# Patient Record
Sex: MALE | Race: WHITE
[De-identification: names, ages, dates, MRNs, and addresses within clinical notes are randomized per-mention and may not be internally consistent; named-entity substitution may affect disease eponyms.]

---

## 2017-01-20 ENCOUNTER — HOSPITAL ENCOUNTER (OUTPATIENT)
Dept: HOSPITAL 53 - M LABDRAW1 | Age: 62
End: 2017-01-20
Attending: FAMILY MEDICINE
Payer: OTHER GOVERNMENT

## 2017-01-20 DIAGNOSIS — I10: Primary | ICD-10-CM

## 2017-01-20 DIAGNOSIS — E78.2: ICD-10-CM

## 2017-01-20 LAB
ALBUMIN SERPL BCG-MCNC: 4.1 GM/DL (ref 3.2–5.2)
ALBUMIN/GLOB SERPL: 1.58 {RATIO} (ref 1–1.93)
ALP SERPL-CCNC: 55 U/L (ref 45–117)
ALT SERPL W P-5'-P-CCNC: 28 U/L (ref 12–78)
ANION GAP SERPL CALC-SCNC: 9 MEQ/L (ref 8–16)
AST SERPL-CCNC: 22 U/L (ref 15–37)
BILIRUB SERPL-MCNC: 0.4 MG/DL (ref 0.2–1)
BUN SERPL-MCNC: 24 MG/DL (ref 7–18)
CALCIUM SERPL-MCNC: 8.8 MG/DL (ref 8.5–10.1)
CHLORIDE SERPL-SCNC: 107 MEQ/L (ref 98–107)
CHOLEST SERPL-MCNC: 135 MG/DL (ref ?–200)
CO2 SERPL-SCNC: 25 MEQ/L (ref 21–32)
CREAT SERPL-MCNC: 0.93 MG/DL (ref 0.7–1.3)
GFR SERPL CREATININE-BSD FRML MDRD: > 60 ML/MIN/{1.73_M2} (ref 56–?)
GLUCOSE SERPL-MCNC: 89 MG/DL (ref 70–105)
POTASSIUM SERPL-SCNC: 4.3 MEQ/L (ref 3.5–5.1)
PROT SERPL-MCNC: 6.7 GM/DL (ref 6.4–8.2)
SODIUM SERPL-SCNC: 141 MEQ/L (ref 136–145)
TRIGL SERPL-MCNC: 50 MG/DL (ref ?–150)

## 2017-11-10 ENCOUNTER — HOSPITAL ENCOUNTER (OUTPATIENT)
Dept: HOSPITAL 53 - M LABDRAW1 | Age: 62
End: 2017-11-10
Attending: FAMILY MEDICINE
Payer: OTHER GOVERNMENT

## 2017-11-10 DIAGNOSIS — I10: ICD-10-CM

## 2017-11-10 DIAGNOSIS — K43.9: ICD-10-CM

## 2017-11-10 DIAGNOSIS — E78.2: ICD-10-CM

## 2017-11-10 DIAGNOSIS — Z00.00: Primary | ICD-10-CM

## 2017-11-10 LAB
ALBUMIN SERPL BCG-MCNC: 3.8 GM/DL (ref 3.2–5.2)
ALBUMIN/GLOB SERPL: 1.41 {RATIO} (ref 1–1.93)
ALP SERPL-CCNC: 49 U/L (ref 45–117)
ALT SERPL W P-5'-P-CCNC: 25 U/L (ref 12–78)
ANION GAP SERPL CALC-SCNC: 8 MEQ/L (ref 8–16)
AST SERPL-CCNC: 25 U/L (ref 7–37)
BASOPHILS # BLD AUTO: 0.1 10^3/UL (ref 0–0.2)
BASOPHILS NFR BLD AUTO: 0.6 % (ref 0–1)
BILIRUB SERPL-MCNC: 0.7 MG/DL (ref 0.2–1)
BUN SERPL-MCNC: 13 MG/DL (ref 7–18)
CALCIUM SERPL-MCNC: 8.6 MG/DL (ref 8.5–10.1)
CHLORIDE SERPL-SCNC: 103 MEQ/L (ref 98–107)
CHOLEST SERPL-MCNC: 128 MG/DL (ref ?–200)
CO2 SERPL-SCNC: 27 MEQ/L (ref 21–32)
CREAT SERPL-MCNC: 0.85 MG/DL (ref 0.7–1.3)
EOSINOPHIL # BLD AUTO: 0.2 10^3/UL (ref 0–0.5)
EOSINOPHIL NFR BLD AUTO: 1.7 % (ref 0–3)
ERYTHROCYTE [DISTWIDTH] IN BLOOD BY AUTOMATED COUNT: 13.6 % (ref 11.5–14.5)
GFR SERPL CREATININE-BSD FRML MDRD: > 60 ML/MIN/{1.73_M2} (ref 56–?)
GLUCOSE SERPL-MCNC: 86 MG/DL (ref 70–105)
IMM GRANULOCYTES NFR BLD: 0.1 % (ref 0–0)
LYMPHOCYTES # BLD AUTO: 2.9 10^3/UL (ref 1.5–4.5)
LYMPHOCYTES NFR BLD AUTO: 33.9 % (ref 24–44)
MCH RBC QN AUTO: 32.8 PG (ref 27–33)
MCHC RBC AUTO-ENTMCNC: 33.6 G/DL (ref 32–36.5)
MCV RBC AUTO: 97.5 FL (ref 80–96)
MONOCYTES # BLD AUTO: 0.8 10^3/UL (ref 0–0.8)
MONOCYTES NFR BLD AUTO: 9.6 % (ref 0–5)
NEUTROPHILS # BLD AUTO: 4.7 10^3/UL (ref 1.8–7.7)
NEUTROPHILS NFR BLD AUTO: 54.1 % (ref 36–66)
NRBC BLD AUTO-RTO: 0 % (ref 0–0)
PLATELET # BLD AUTO: 206 10^3/UL (ref 150–450)
POTASSIUM SERPL-SCNC: 4.2 MEQ/L (ref 3.5–5.1)
PROT SERPL-MCNC: 6.5 GM/DL (ref 6.4–8.2)
SODIUM SERPL-SCNC: 138 MEQ/L (ref 136–145)
TRIGL SERPL-MCNC: 67 MG/DL (ref ?–150)
WBC # BLD AUTO: 8.7 10^3/UL (ref 4–10)

## 2017-11-20 ENCOUNTER — HOSPITAL ENCOUNTER (OUTPATIENT)
Dept: HOSPITAL 53 - M SDC | Age: 62
Discharge: HOME | End: 2017-11-20
Attending: SURGERY
Payer: OTHER GOVERNMENT

## 2017-11-20 VITALS — DIASTOLIC BLOOD PRESSURE: 72 MMHG | SYSTOLIC BLOOD PRESSURE: 128 MMHG

## 2017-11-20 VITALS — BODY MASS INDEX: 26.65 KG/M2 | WEIGHT: 190.4 LBS | HEIGHT: 71 IN

## 2017-11-20 DIAGNOSIS — Z72.0: ICD-10-CM

## 2017-11-20 DIAGNOSIS — E78.00: ICD-10-CM

## 2017-11-20 DIAGNOSIS — I10: ICD-10-CM

## 2017-11-20 DIAGNOSIS — Z79.899: ICD-10-CM

## 2017-11-20 DIAGNOSIS — K40.90: Primary | ICD-10-CM

## 2017-11-20 PROCEDURE — 88304 TISSUE EXAM BY PATHOLOGIST: CPT

## 2017-11-20 PROCEDURE — 49650 LAP ING HERNIA REPAIR INIT: CPT

## 2017-11-20 NOTE — ROOPDOC
[facility] Report Of Operation


Report of Operation


DATE OF PROCEDURE: 11/20/17





PREPROCEDURE DIAGNOSES: Recurrent left inguinal hernia





POSTPROCEDURE DIAGNOSES: Left Indirect Inguinal Hernia.





PROCEDURE: Robotic Assisted Laparoscopic Left Inguinal Hernia Repair (PARVIZ). 





SURGEON: ELIAN Cesar MD





ASSISTANT: ROBERT Maldonado NP





ANESTHESIA: General Anesthesia.





ESTIMATED BLOOD LOSS: Approximately 20 mL. 





COMPLICATIONS: none. 





REMARKS: 61 M with recurrent left inguinal hernia. Previously had an open 

inguinal hernia repair.





PROCEDURE NOTE: transient bradycardia on initial insufflation, procedure was 

done at 12 mm Hg. Large preperiotneal tissue (cord lipoma into the internal 

inguinal ring removed. Large 3D-Max light mesh placed at the preperitoneal 

space and secured to the pubic tubercle with 2-0 vicryl.. 





DESCRIPTION OF PROCEDURE: 





Patient received 2 g of Ancef IV preoperatively for wound prophylaxis. Patient 

was brought to the operating room, placed supine on the operating table. 

Compression boots placed in both lower extremities for DVT prophylaxis. After 

adequate general anesthesia started, he was placed on a lithotomy position. A 

son catheter placed without difficulty. His abdomen and groin/pelvic area 

then prepped and draped in the usual sterile fashion. After a surgical timeout 

we began our surgery.








Entry to the abdomen done through a small incision above the umbilical skin 

cleft. A Veress needle is inserted on a controlled fashion. CO2 insufflation 

started to pressure 15 mmHg. I was informed that time that patient is having 

bradycardia. He was treated for this. We released the pneumoperitoneum with 

increase of his heart rate. After his heart rate has to have lysed to be 

resumed pneumoperitoneum at 12 mmHg. Using the same incision a 5 mm Visiport 

was then placed under direct vision of laparoscope. The insertion site was 

inspected for injury and none was found. Patient was then positioned on a 

Trendelenburg position with the symptomatic (left) side tilted upwards for 

adequate view of the hernia defect. 2 working ports placed to the right and 

left of the umbilicus along the same line. The da Miranda robot to our was then 

positioned in between the patient's legs and the trochars doctor onto the 

robot. I then unscrubbed and to control of the camera and the laparoscopic 

instruments at the surgeon's console.





Patient only had a small opening through his peritoneum at the indirect space. 

Once this was opened up he had a large preperitoneal fat/cord lipoma.





Starting at the left side. The peritoneum was opened up about 3 cm above the 

superior edge of the fascial defect of the inguinal hernia starting at the 

medial umbilical ligament going in an arc-like fashion laterally towards the 

level of the anterior superior iliac spine. This was then dissected mostly 

bluntly away from the abdominal wall. On approaching the inguinal hernia defect 

the hernia sac was pulled back into the preperitoneal space and carefully 

dissected off the testicular vessels and vas deferens. He has a large and 

redundant inguinal hernia sac which was fully reduced back into the abdomen. A 

large preperitoneal cord lipoma was found and dissected free from the rest of 

the structures. Once this was fully reduced into the abdomen a had to divide 

this away from the rest of the preperitoneal fat tissue with the Bovie cautery 

while controlling for bleeding. After freeing up the hernia sac we continued 

dissecting it off inferiorly to  from the vas deferens and testicular 

vessels. The preperitoneal space was dissected medially to expose the pubic 

tubercle up towards the symphysis pubis. The remaining areolar  fibers were 

bluntly dissected away from the abdominal wall to create space for the mesh. 

After fully dissecting the preperitoneal space, we checked for adequate 

hemostasis.





A large sized 3-D Max light mesh was chosen. This was placed through the 

laparoscopic port into the abdomen. This was positioned in the preperitoneal 

space abutting the abdominal wall to adequately cover the indirect as well as 

direct hernia space. I made sure the mesh was laying flat on the abdominal 

wall. This was secured onto the pubic tubercle with a single stitch of 2-0 

Vicryl. Again after checking for hemostasis the preperitoneal space was then 

closed by suturing the peritoneal incision using a running stitch of 2-0V AUSTIN PETERSON MD Nov 20, 2017 13:46

## 2020-12-03 ENCOUNTER — HOSPITAL ENCOUNTER (OUTPATIENT)
Dept: HOSPITAL 53 - M PLALAB | Age: 65
End: 2020-12-03
Attending: NURSE PRACTITIONER
Payer: COMMERCIAL

## 2020-12-03 DIAGNOSIS — I10: Primary | ICD-10-CM

## 2020-12-03 LAB
ALBUMIN SERPL BCG-MCNC: 3.8 GM/DL (ref 3.2–5.2)
ALT SERPL W P-5'-P-CCNC: 28 U/L (ref 12–78)
BILIRUB SERPL-MCNC: 0.5 MG/DL (ref 0.2–1)
BUN SERPL-MCNC: 19 MG/DL (ref 7–18)
CALCIUM SERPL-MCNC: 8.9 MG/DL (ref 8.8–10.2)
CHLORIDE SERPL-SCNC: 103 MEQ/L (ref 98–107)
CHOLEST SERPL-MCNC: 155 MG/DL (ref ?–200)
CHOLEST/HDLC SERPL: 3.37 {RATIO} (ref ?–5)
CO2 SERPL-SCNC: 27 MEQ/L (ref 21–32)
CREAT SERPL-MCNC: 0.84 MG/DL (ref 0.7–1.3)
GFR SERPL CREATININE-BSD FRML MDRD: > 60 ML/MIN/{1.73_M2} (ref 49–?)
GLUCOSE SERPL-MCNC: 82 MG/DL (ref 70–100)
HDLC SERPL-MCNC: 46 MG/DL (ref 40–?)
LDLC SERPL CALC-MCNC: 91 MG/DL (ref ?–100)
NONHDLC SERPL-MCNC: 109 MG/DL
POTASSIUM SERPL-SCNC: 4.6 MEQ/L (ref 3.5–5.1)
PROT SERPL-MCNC: 7.2 GM/DL (ref 6.4–8.2)
SODIUM SERPL-SCNC: 137 MEQ/L (ref 136–145)
TRIGL SERPL-MCNC: 88 MG/DL (ref ?–150)

## 2022-01-13 ENCOUNTER — HOSPITAL ENCOUNTER (OUTPATIENT)
Dept: HOSPITAL 53 - M PLALAB | Age: 67
End: 2022-01-13
Attending: NURSE PRACTITIONER
Payer: COMMERCIAL

## 2022-01-13 DIAGNOSIS — I10: Primary | ICD-10-CM

## 2022-01-13 DIAGNOSIS — E78.2: ICD-10-CM

## 2022-01-13 LAB
ALBUMIN SERPL BCG-MCNC: 3.9 GM/DL (ref 3.2–5.2)
ALT SERPL W P-5'-P-CCNC: 28 U/L (ref 12–78)
BILIRUB SERPL-MCNC: 0.6 MG/DL (ref 0.2–1)
BUN SERPL-MCNC: 16 MG/DL (ref 7–18)
CALCIUM SERPL-MCNC: 9.1 MG/DL (ref 8.8–10.2)
CHLORIDE SERPL-SCNC: 108 MEQ/L (ref 98–107)
CHOLEST SERPL-MCNC: 142 MG/DL (ref ?–200)
CHOLEST/HDLC SERPL: 3.15 {RATIO} (ref ?–5)
CO2 SERPL-SCNC: 27 MEQ/L (ref 21–32)
CREAT SERPL-MCNC: 0.92 MG/DL (ref 0.7–1.3)
GFR SERPL CREATININE-BSD FRML MDRD: > 60 ML/MIN/{1.73_M2} (ref 49–?)
GLUCOSE SERPL-MCNC: 87 MG/DL (ref 70–100)
HDLC SERPL-MCNC: 45 MG/DL (ref 40–?)
LDLC SERPL CALC-MCNC: 74 MG/DL (ref ?–100)
NONHDLC SERPL-MCNC: 97 MG/DL
POTASSIUM SERPL-SCNC: 4.7 MEQ/L (ref 3.5–5.1)
PROT SERPL-MCNC: 7 GM/DL (ref 6.4–8.2)
SODIUM SERPL-SCNC: 142 MEQ/L (ref 136–145)
TRIGL SERPL-MCNC: 117 MG/DL (ref ?–150)

## 2023-10-04 ENCOUNTER — HOSPITAL ENCOUNTER (OUTPATIENT)
Dept: HOSPITAL 53 - M OPP | Age: 68
Discharge: HOME | End: 2023-10-04
Attending: SURGERY
Payer: COMMERCIAL

## 2023-10-04 VITALS — HEIGHT: 71 IN | BODY MASS INDEX: 27.58 KG/M2 | WEIGHT: 197 LBS

## 2023-10-04 VITALS — DIASTOLIC BLOOD PRESSURE: 77 MMHG | TEMPERATURE: 97.2 F | OXYGEN SATURATION: 96 % | SYSTOLIC BLOOD PRESSURE: 114 MMHG

## 2023-10-04 DIAGNOSIS — I10: ICD-10-CM

## 2023-10-04 DIAGNOSIS — Z87.891: ICD-10-CM

## 2023-10-04 DIAGNOSIS — Z12.11: Primary | ICD-10-CM

## 2023-10-04 DIAGNOSIS — Z79.899: ICD-10-CM

## 2023-10-04 DIAGNOSIS — K63.5: ICD-10-CM

## 2023-10-04 DIAGNOSIS — M16.11: ICD-10-CM

## 2023-10-04 DIAGNOSIS — E78.5: ICD-10-CM

## 2024-11-15 ENCOUNTER — HOSPITAL ENCOUNTER (OUTPATIENT)
Dept: HOSPITAL 53 - M LABDRAWC | Age: 69
End: 2024-11-15
Attending: NURSE PRACTITIONER
Payer: MEDICARE

## 2024-11-15 DIAGNOSIS — I10: Primary | ICD-10-CM

## 2024-11-15 DIAGNOSIS — E78.2: ICD-10-CM

## 2024-11-15 LAB
ALBUMIN SERPL BCG-MCNC: 3.5 G/DL (ref 3.2–5.2)
ALP SERPL-CCNC: 61 U/L (ref 40–129)
ALT SERPL W P-5'-P-CCNC: 22 U/L (ref 7–40)
AST SERPL-CCNC: 18 U/L (ref ?–34)
BILIRUB SERPL-MCNC: 0.4 MG/DL (ref 0.3–1.2)
BUN SERPL-MCNC: 16 MG/DL (ref 9–23)
CALCIUM SERPL-MCNC: 9.4 MG/DL (ref 8.3–10.6)
CHLORIDE SERPL-SCNC: 108 MMOL/L (ref 98–107)
CHOLEST SERPL-MCNC: 134 MG/DL (ref ?–200)
CHOLEST/HDLC SERPL: 3.51 {RATIO} (ref ?–5)
CO2 SERPL-SCNC: 27 MMOL/L (ref 20–31)
CREAT SERPL-MCNC: 0.75 MG/DL (ref 0.7–1.3)
GFR SERPL CREATININE-BSD FRML MDRD: > 60 ML/MIN/{1.73_M2} (ref 49–?)
GLUCOSE SERPL-MCNC: 88 MG/DL (ref 74–106)
HDLC SERPL-MCNC: 38.1 MG/DL (ref 40–?)
LDLC SERPL CALC-MCNC: 80.1 MG/DL (ref ?–100)
NONHDLC SERPL-MCNC: 95.9 MG/DL
POTASSIUM SERPL-SCNC: 4.2 MMOL/L (ref 3.5–5.1)
PROT SERPL-MCNC: 7 G/DL (ref 5.7–8.2)
SODIUM SERPL-SCNC: 141 MMOL/L (ref 136–145)
TRIGL SERPL-MCNC: 79 MG/DL (ref ?–150)

## 2025-01-03 ENCOUNTER — HOSPITAL ENCOUNTER (OUTPATIENT)
Dept: HOSPITAL 53 - M RAD | Age: 70
End: 2025-01-03
Attending: NURSE PRACTITIONER
Payer: MEDICARE

## 2025-01-03 DIAGNOSIS — Z87.891: ICD-10-CM

## 2025-01-03 DIAGNOSIS — Z12.2: Primary | ICD-10-CM
